# Patient Record
Sex: MALE | Race: WHITE | NOT HISPANIC OR LATINO | Employment: FULL TIME | URBAN - METROPOLITAN AREA
[De-identification: names, ages, dates, MRNs, and addresses within clinical notes are randomized per-mention and may not be internally consistent; named-entity substitution may affect disease eponyms.]

---

## 2017-09-19 ENCOUNTER — TELEPHONE (OUTPATIENT)
Dept: TRANSPLANT | Facility: CLINIC | Age: 47
End: 2017-09-19

## 2017-09-19 NOTE — TELEPHONE ENCOUNTER
Call placed to patient in review of outstanding transplant follow up. Patient agreeable to complete labs and clinic visit. Patient reports no current transplant related issues and follows with Dr. Townsend on a regular bases. Hx recurrent GN, proteinuria and was to have GI work up locally. Patient has not had that done. Records requested from Dr. Townsend office.

## 2017-10-12 ENCOUNTER — DOCUMENTATION ONLY (OUTPATIENT)
Dept: TRANSPLANT | Facility: CLINIC | Age: 47
End: 2017-10-12

## 2017-10-12 LAB
EXT ALBUMIN: 2.3
EXT ALKALINE PHOSPHATASE: 43
EXT ALT: 12
EXT AST: 16
EXT BACTERIA UA: ABNORMAL
EXT BILIRUBIN DIRECT: 0 MG/DL
EXT BILIRUBIN TOTAL: 0.7
EXT BUN: 42
EXT CALCIUM: 7.3
EXT CHLORIDE: 110
EXT CO2: 24
EXT CREATININE: 3.2 MG/DL
EXT EOSINOPHIL%: 1.3
EXT GFR MDRD NON AF AMER: 22
EXT GLUCOSE UA: 150
EXT GLUCOSE: 74
EXT HEMATOCRIT: 40.1
EXT HEMOGLOBIN: 13.6
EXT LYMPH%: 18.4
EXT MAGNESIUM: 1.8
EXT MONOCYTES%: 7.9
EXT NITRITES UA: ABNORMAL
EXT PHOSPHORUS: 5.8
EXT PLATELETS: 180
EXT POTASSIUM: 3.8
EXT PROT/CREAT RATIO UR: 12.71
EXT PROTEIN TOTAL: 4.8
EXT PROTEIN UA: >=500
EXT RBC UA: 48
EXT SEGS%: 71.9
EXT SODIUM: 140 MMOL/L
EXT TACROLIMUS LVL: 3.3
EXT WBC UA: 6
EXT WBC: 12.2

## 2017-10-14 ENCOUNTER — DOCUMENTATION ONLY (OUTPATIENT)
Dept: TRANSPLANT | Facility: CLINIC | Age: 47
End: 2017-10-14

## 2017-10-15 NOTE — PROGRESS NOTES
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 Now has worsening proteinuria and significant CKD. At present I see no reason to change action plan despite worsened proteinuria with stable-improved creatinine given hx non-adherence.  Let's discuss at Wednesday staff metting to review recent outpatient labs and get group consensus about further treatment (if any).

## 2017-10-17 ENCOUNTER — DOCUMENTATION ONLY (OUTPATIENT)
Dept: TRANSPLANT | Facility: CLINIC | Age: 47
End: 2017-10-17

## 2017-10-17 LAB
EXT ALBUMIN: 2.4
EXT ALKALINE PHOSPHATASE: 46
EXT ALT: 13
EXT AST: 17
EXT BILIRUBIN DIRECT: 0 MG/DL
EXT BILIRUBIN TOTAL: 0.3
EXT BUN: 48
EXT CALCIUM: 8.1
EXT CHLORIDE: 106
EXT CO2: 24
EXT CREATININE: 3.9 MG/DL
EXT EOSINOPHIL%: 0.1
EXT GFR MDRD AF AMER: 22
EXT GFR MDRD NON AF AMER: 18
EXT GLUCOSE: 159
EXT HEMATOCRIT: 42.8
EXT HEMOGLOBIN: 14.1
EXT LYMPH%: 10.3
EXT MAGNESIUM: 1.8
EXT MONOCYTES%: 5.1
EXT PHOSPHORUS: 4.9
EXT PLATELETS: 203
EXT POTASSIUM: 3.8
EXT PROT/CREAT RATIO UR: 11.93
EXT PROTEIN TOTAL: 5
EXT SEGS%: 84.1
EXT SODIUM: 142 MMOL/L
EXT TACROLIMUS LVL: 4.5
EXT WBC: 11.1

## 2017-10-22 ENCOUNTER — COMMITTEE REVIEW (OUTPATIENT)
Dept: TRANSPLANT | Facility: CLINIC | Age: 47
End: 2017-10-22

## 2017-10-22 DIAGNOSIS — Z94.0 DECEASED-DONOR KIDNEY TRANSPLANT: ICD-10-CM

## 2017-10-22 DIAGNOSIS — N04.20 MEMBRANOUS GLOMERULONEPHRITIS WITH NEPHROSIS: ICD-10-CM

## 2017-10-22 DIAGNOSIS — T86.19 OTHER COMPLICATION OF KIDNEY TRANSPLANT: ICD-10-CM

## 2017-10-22 DIAGNOSIS — N17.9 AKI (ACUTE KIDNEY INJURY): ICD-10-CM

## 2017-10-22 NOTE — COMMITTEE REVIEW
Patient discussed at team mtg per request of Dr. Bedoya. Patient with Nephrotic range proteinuria has not followed up  With Transplant or General Nephrology recommendation for treatment after previous biopsy. Patient now with worsened proteinuria will be seen in transplant clinic. Team to determine if follow up biopsy is warranted before treatment since patient coming from [place].    Team review:  Patient to be seen in clinic and follow plan determined at that time. Patient must decide if he is interested in treatment here.  I was present at the meeting and attest to the decision of the committee.

## 2017-10-23 ENCOUNTER — OFFICE VISIT (OUTPATIENT)
Dept: TRANSPLANT | Facility: CLINIC | Age: 47
End: 2017-10-23
Payer: COMMERCIAL

## 2017-10-23 ENCOUNTER — DOCUMENTATION ONLY (OUTPATIENT)
Dept: TRANSPLANT | Facility: CLINIC | Age: 47
End: 2017-10-23

## 2017-10-23 VITALS
SYSTOLIC BLOOD PRESSURE: 192 MMHG | DIASTOLIC BLOOD PRESSURE: 132 MMHG | OXYGEN SATURATION: 97 % | WEIGHT: 279.13 LBS | RESPIRATION RATE: 20 BRPM | HEART RATE: 80 BPM | TEMPERATURE: 98 F | BODY MASS INDEX: 39.96 KG/M2 | HEIGHT: 70 IN

## 2017-10-23 DIAGNOSIS — N18.30 CKD (CHRONIC KIDNEY DISEASE) STAGE 3, GFR 30-59 ML/MIN: Chronic | ICD-10-CM

## 2017-10-23 DIAGNOSIS — Z94.0 IMMUNOSUPPRESSIVE MANAGEMENT ENCOUNTER FOLLOWING KIDNEY TRANSPLANT: ICD-10-CM

## 2017-10-23 DIAGNOSIS — T86.19 OTHER COMPLICATION OF KIDNEY TRANSPLANT: Primary | Chronic | ICD-10-CM

## 2017-10-23 DIAGNOSIS — Z79.899 IMMUNOSUPPRESSIVE MANAGEMENT ENCOUNTER FOLLOWING KIDNEY TRANSPLANT: ICD-10-CM

## 2017-10-23 DIAGNOSIS — Z94.0 DECEASED-DONOR KIDNEY TRANSPLANT: Chronic | ICD-10-CM

## 2017-10-23 DIAGNOSIS — N04.20 MEMBRANOUS GLOMERULONEPHRITIS WITH NEPHROSIS: Chronic | ICD-10-CM

## 2017-10-23 DIAGNOSIS — Z29.89 NEED FOR PROPHYLACTIC IMMUNOTHERAPY: Chronic | ICD-10-CM

## 2017-10-23 PROCEDURE — 99999 PR PBB SHADOW E&M-EST. PATIENT-LVL IV: CPT | Mod: PBBFAC,,, | Performed by: INTERNAL MEDICINE

## 2017-10-23 PROCEDURE — 99215 OFFICE O/P EST HI 40 MIN: CPT | Mod: S$GLB,,, | Performed by: INTERNAL MEDICINE

## 2017-10-23 RX ORDER — MYCOPHENOLATE MOFETIL 250 MG/1
750 CAPSULE ORAL 2 TIMES DAILY
Qty: 120 CAPSULE | Refills: 11 | Status: SHIPPED | OUTPATIENT
Start: 2017-10-23 | End: 2018-09-18 | Stop reason: ALTCHOICE

## 2017-10-23 RX ORDER — BUMETANIDE 2 MG/1
4 TABLET ORAL DAILY
COMMUNITY

## 2017-10-23 RX ORDER — METOLAZONE 2.5 MG/1
2.5 TABLET ORAL EVERY OTHER DAY
COMMUNITY

## 2017-10-23 RX ORDER — HYDRALAZINE HYDROCHLORIDE 10 MG/1
10 TABLET, FILM COATED ORAL 2 TIMES DAILY
COMMUNITY

## 2017-10-23 RX ORDER — TACROLIMUS 1 MG/1
1 CAPSULE ORAL EVERY 12 HOURS
Qty: 60 CAPSULE | Refills: 11 | Status: SHIPPED | OUTPATIENT
Start: 2017-10-23 | End: 2019-07-10 | Stop reason: ALTCHOICE

## 2017-10-23 NOTE — LETTER
October 23, 2017        Marcos Townsend  [address]  [facility] [address]  Phone: [phone]  Fax: [phone]             [facility]  [address]  [facility] [address]  Phone: [phone]   Patient: Marcos Dominguez   MR Number: [id number]   YOB: 1970   Date of Visit: 10/23/2017       Dear Dr. Marcos Townsend    Thank you for referring Marcos Dominguez to me for evaluation. Attached you will find relevant portions of my assessment and plan of care.    If you have questions, please do not hesitate to call me. I look forward to following Marcos Dominguez along with you.    Sincerely,    Kelly Aguila MD    Enclosure    If you would like to receive this communication electronically, please contact [email or web address] or [phone] to request [employer] access.    [employer] is a tool which provides read-only access to select patient information with whom you have a relationship. Its easy to use and provides real time access to review your patients record including encounter summaries, notes, results, and demographic information.    If you feel you have received this communication in error or would no longer like to receive these types of communications, please e-mail [email or web address]

## 2017-10-23 NOTE — PATIENT INSTRUCTIONS
Please weigh yourself daily.and record.  Goal is to lose 1-3 lbs daily.  Get frequent labs.  When edema resolved, call your nephrologist to lower diuretic.  Call Dr. Townsend if your weight does not drop (while you are swollen) or if you are gaining weight -  5 lb change is concerning.  Take hydralazine 10 mg twice daily and let Dr. Townsend' office know if BP remains >140/90.  Call your vascular surgeon about a new access for dialysis.    If you feel worse before you see Dr. Townsend again, please go to ED, especially if you develop chest pain, worsened breathing, nausea/vomiting.    Please get influenza vaccine this year and every fall in the future.  You can take the killed virus shot in the arm, but not the nasal spray (which is live vaccine).  The shot can be obtained at any pharmacy or health care facility.     Plan to see your kidney doctor in 2 weeks.

## 2017-10-23 NOTE — PROGRESS NOTES
Kidney Post-Transplant Assessment (note he arrived 20+ min late)    Referring Physician:   Current Nephrologist: Marcos Townsend    ORGAN: RIGHT KIDNEY  Donor Type:  - brain death  PHS Increased Risk: no  Cold Ischemia: 1467.6 mins  Induction Medications: thymoglobulin, steroids (prednisone,methylprednisolone,solumedrol,medrol,decadron)    Subjective:     CC:  Reassessment of renal allograft function and management of chronic immunosuppression.    HPI:  Mr. Dominguez is a 47 y.o. year old White male who received a  - brain death kidney transplant on 11.  He has CKD stage 4 - GFR 15-29 and his baseline creatinine was 1.5, but was 2.2 in 2016 and is now 3.2-3.9. He takes mycophenolate mofetil, prednisone and tacrolimus for maintenance immunosuppression, BUT has been of MMF and cannot recall for how long or why it was stopped or by whom. He denies any recent hospitalizations or ER visits since his previous clinic visit.    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 He saw us last 16. At that visit, plan was he would complete GI w/u locally (inc cscope), and if no secondary cause ofr MGN was found, it was recommended he receive rituxan 1 gram q2 weeks x 2 doses. I personally communicated this plan to Dr. Townsend (see tel enc 16).  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 He is here today after a recent ICU admission for dyspnea and edema. At that time, he was given higher dose of diuretic with improvement of symptoms and d/c'd 10/13/17. He was home for a few days when he noted worsening dyspnea and saw NP at Dr. Townsend' office 10/19. The, he was  "given bumex, xaroxolyn with good response, but he is unsure how much weight he lost. He states he feels better overall now than he did last week when he saw provider 10/19.  HTN is noted to be poorly controlled today. Scale shows 40# weight gain since he was last seen 8/2016. He was started on hydralazine recently (after ICU admit), but has not yet taken first dose today (now ~2PM).  He also reports having AVF ligated 2 mos ago d/t hand ischemia.   The GI symptoms he had last summer have resolved, and he has no N/V/D presently.    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 Previously reported FSGS)   -Failed DD kidney transplant #1 2/99-1/08  -Kidney transplant #2 on 7/4/11. 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    Review of Systems   Constitutional: Positive for unexpected weight change.   Respiratory: Positive for shortness of breath (stella BEGUM).    Cardiovascular: Positive for leg swelling. Negative for chest pain.   Gastrointestinal: Negative for abdominal pain, nausea and vomiting.   Genitourinary: Negative for difficulty urinating.   Skin: Negative for rash.   Allergic/Immunologic: Positive for immunocompromised state.     Objective:   Blood pressure (!) 192/132, pulse 80, temperature 98 °F (36.7 °C), temperature source Oral, resp. rate 20, height 5' 10" (1.778 m), weight 126.6 kg (279 lb 1.6 oz), SpO2 97 %.body mass index is 40.05 kg/m².    Physical Exam   Constitutional: He is oriented to person, place, and time. He appears well-developed and well-nourished.   HENT:   Head: Normocephalic.   Eyes: Conjunctivae are normal.   Cardiovascular: Normal rate and regular rhythm.    Pulmonary/Chest: Effort normal.   No crackles " farida, but patient clearly winded after walking from bathroom to exam. Tachypnea resolved after sitting for few min.   Abdominal: Soft. He exhibits no mass. There is no tenderness.   Musculoskeletal: He exhibits edema (1-2+ bila LE).   Neurological: He is alert and oriented to person, place, and time.   Skin: Skin is dry. No rash noted.   Psychiatric: He has a normal mood and affect. Judgment normal.     Labs:  Lab Results   Component Value Date    EXTANC  2016      Comment:      unsolicited labs, elevated protein creatinine ratio.    EXTWBC 11.1 10/16/2017    EXTSEGS 84.1 10/16/2017    EXTPLATELETS 203 10/16/2017    EXTHEMOGLOBI 14.1 10/16/2017    EXTHEMATOCRI 42.8 10/16/2017    EXTCREATININ 3.9 10/16/2017    EXTSODIUM 142 10/16/2017    EXTPOTASSIUM 3.8 10/16/2017    EXTBUN 48 10/16/2017    EXTCO2 24 10/16/2017    EXTCALCIUM 8.1 10/16/2017    EXTPHOSPHORU 4.9 10/16/2017    EXTGLUCOSE 159 10/16/2017    EXTALBUMIN 2.4 10/16/2017    EXTAST 17 10/16/2017    EXTALT 13 10/16/2017    EXTBILITOTAL 0.3 10/16/2017     Lab Results   Component Value Date    EXTTACROLVL 4.5 10/16/2017    EXTPROTCRE 11.93 10/16/2017    EXTPTHINTACT 80.6 2016    EXTPROTEINUA >=500 (H) 10/10/2017    EXTWBCUA 6 10/10/2017    EXTRBCUA 48 10/10/2017   Labs were reviewed with the patient.    Assessment:     1. Other complication of kidney transplant    2. 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 -donor kidney transplant #2 for FSGS 2011    4. CKD (chronic kidney disease) stage 3, GFR 30-59 ml/min    5. Chronic immunosuppression with Prograf, Cellcept and Prednisone    6. Immunosuppressive management encounter following kidney transplant        Plan:   Unfortunately, CKD appears to be progressing and he did not receive recommended therapy for recurrent MGN. He is now approaching ESRD and needs aggressive CKD care.  I advised:  -Continue to take diuretics at the higher doses recommended last  week.  -Stay on low Na diet.  -See Dr. Townsend within next week.  -Report to ED if breathing worsens or develops new symptoms (intractable HA, N/V, s/s uremia reviewed).  -Reminded of the importance of obtaining flu vaccination this and every fall.   -See vascular surgeon about new dialysis access planning.  -I refilled MMF and tacro.  -He was interested in a higher dose of Chantix; I explained it is safe to use, but I am not familiar with the dosing, so I suggested he discuss w previous prescribing doctor.  -Advised to keep us posted on his progress.    Follow-up:   Annual follow-up with kidney transplant clinic with repeat labs, including renal function panel with UA, urine protein/creatinine ratio, and drug trough level q3 months.  Patient also reminded to follow-up with general nephrologist.    Kelly Aguila MD       Education:   Material provided to the patient.  Patient reminded to call with any health changes since these can be early signs of significant complications.  Also, I advised the patient to be sure any new medications or changes of old medications are discussed with either a pharmacist or physician knowledgeable with transplant to avoid rejection/drug toxicity related to significant drug interactions.    UNOS Patient Status  Functional Status: 60% - Requires occasional assistance but is able to care for needs  Physical Capacity: No Limitations

## 2018-08-24 ENCOUNTER — TELEPHONE (OUTPATIENT)
Dept: TRANSPLANT | Facility: CLINIC | Age: 48
End: 2018-08-24

## 2018-09-06 DIAGNOSIS — Z76.82 ORGAN TRANSPLANT CANDIDATE: Primary | ICD-10-CM

## 2018-09-18 ENCOUNTER — HOSPITAL ENCOUNTER (OUTPATIENT)
Dept: RADIOLOGY | Facility: HOSPITAL | Age: 48
Discharge: HOME | End: 2018-09-18
Attending: NURSE PRACTITIONER
Payer: MEDICARE

## 2018-09-18 ENCOUNTER — OFFICE VISIT (OUTPATIENT)
Dept: TRANSPLANT | Facility: CLINIC | Age: 48
End: 2018-09-18
Payer: MEDICARE

## 2018-09-18 ENCOUNTER — TELEPHONE (OUTPATIENT)
Dept: TRANSPLANT | Facility: CLINIC | Age: 48
End: 2018-09-18

## 2018-09-18 ENCOUNTER — HOSPITAL ENCOUNTER (OUTPATIENT)
Dept: RADIOLOGY | Facility: HOSPITAL | Age: 48
Discharge: HOME | End: 2018-09-18
Attending: NURSE PRACTITIONER
Payer: COMMERCIAL

## 2018-09-18 VITALS
OXYGEN SATURATION: 96 % | RESPIRATION RATE: 18 BRPM | HEART RATE: 105 BPM | HEIGHT: 70 IN | TEMPERATURE: 98 F | SYSTOLIC BLOOD PRESSURE: 159 MMHG | DIASTOLIC BLOOD PRESSURE: 95 MMHG | BODY MASS INDEX: 35.63 KG/M2 | WEIGHT: 248.88 LBS

## 2018-09-18 DIAGNOSIS — Z76.82 ORGAN TRANSPLANT CANDIDATE: ICD-10-CM

## 2018-09-18 DIAGNOSIS — Z76.82 ORGAN TRANSPLANT CANDIDATE: Primary | ICD-10-CM

## 2018-09-18 DIAGNOSIS — Z01.818 PRE-TRANSPLANT EVALUATION FOR CHRONIC KIDNEY DISEASE: ICD-10-CM

## 2018-09-18 DIAGNOSIS — Z79.899 IMMUNOSUPPRESSIVE MANAGEMENT ENCOUNTER FOLLOWING KIDNEY TRANSPLANT: ICD-10-CM

## 2018-09-18 DIAGNOSIS — Z99.2 ESRD (END STAGE RENAL DISEASE) ON DIALYSIS: ICD-10-CM

## 2018-09-18 DIAGNOSIS — Z94.0 DECEASED-DONOR KIDNEY TRANSPLANT: Chronic | ICD-10-CM

## 2018-09-18 DIAGNOSIS — N18.6 ESRD (END STAGE RENAL DISEASE) ON DIALYSIS: ICD-10-CM

## 2018-09-18 DIAGNOSIS — T86.19 OTHER COMPLICATION OF KIDNEY TRANSPLANT: Chronic | ICD-10-CM

## 2018-09-18 DIAGNOSIS — Z94.0 IMMUNOSUPPRESSIVE MANAGEMENT ENCOUNTER FOLLOWING KIDNEY TRANSPLANT: ICD-10-CM

## 2018-09-18 PROCEDURE — 76770 US EXAM ABDO BACK WALL COMP: CPT | Mod: TC,TXP

## 2018-09-18 PROCEDURE — 76776 US EXAM K TRANSPL W/DOPPLER: CPT | Mod: 26,TXP,, | Performed by: RADIOLOGY

## 2018-09-18 PROCEDURE — 72170 X-RAY EXAM OF PELVIS: CPT | Mod: TC,FY,TXP

## 2018-09-18 PROCEDURE — 93978 VASCULAR STUDY: CPT | Mod: TC,TXP

## 2018-09-18 PROCEDURE — 72170 X-RAY EXAM OF PELVIS: CPT | Mod: 26,TXP,, | Performed by: RADIOLOGY

## 2018-09-18 PROCEDURE — 71046 X-RAY EXAM CHEST 2 VIEWS: CPT | Mod: TC,FY,TXP

## 2018-09-18 PROCEDURE — 99215 OFFICE O/P EST HI 40 MIN: CPT | Mod: PBBFAC,TXP | Performed by: INTERNAL MEDICINE

## 2018-09-18 PROCEDURE — 99215 OFFICE O/P EST HI 40 MIN: CPT | Mod: S$PBB,GC,TXP, | Performed by: INTERNAL MEDICINE

## 2018-09-18 PROCEDURE — 99204 OFFICE O/P NEW MOD 45 MIN: CPT | Mod: S$PBB,TXP,, | Performed by: NURSE PRACTITIONER

## 2018-09-18 PROCEDURE — 93978 VASCULAR STUDY: CPT | Mod: 26,TXP,, | Performed by: RADIOLOGY

## 2018-09-18 PROCEDURE — 76776 US EXAM K TRANSPL W/DOPPLER: CPT | Mod: TC,TXP

## 2018-09-18 PROCEDURE — 76770 US EXAM ABDO BACK WALL COMP: CPT | Mod: 26,TXP,, | Performed by: RADIOLOGY

## 2018-09-18 PROCEDURE — 99999 PR PBB SHADOW E&M-EST. PATIENT-LVL V: CPT | Mod: PBBFAC,TXP,, | Performed by: INTERNAL MEDICINE

## 2018-09-18 PROCEDURE — 71046 X-RAY EXAM CHEST 2 VIEWS: CPT | Mod: 26,TXP,, | Performed by: RADIOLOGY

## 2018-09-18 PROCEDURE — 99204 OFFICE O/P NEW MOD 45 MIN: CPT | Mod: S$PBB,TXP,, | Performed by: TRANSPLANT SURGERY

## 2018-09-18 NOTE — PROGRESS NOTES
PHARM.D. PRE-TRANSPLANT NOTE:    This patient's medication therapy was evaluated as part of his pre-transplant evaluation.    The following pharmacologic concerns were noted: apixaban (blood thinner), patient also currently on tacrolimus and prednisone 5mg from previous transplant (previously on MMF) - no issues with any of the medications    This patient's medication profile was reviewed for contraindications for DAA Hepatitis C therapy:    [x]  No current inducers of CYP 3A4 or PGP  [x]  No amiodarone on this patient's EMR profile in the last 24 months  [x]  No past or current atrial fibrillation on this patient's EMR profile       Current Outpatient Medications   Medication Sig Dispense Refill    apixaban (ELIQUIS) 2.5 mg Tab Take 2.5 mg by mouth 2 (two) times daily.      bumetanide (BUMEX) 2 MG tablet Take 4 mg by mouth once daily.      calcitRIOL (ROCALTROL) 0.25 MCG Cap Take 0.25 mcg by mouth every Monday and Thursday.       cinacalcet (SENSIPAR) 90 MG Tab Take 90 mg by mouth daily with breakfast.        divalproex (DEPAKOTE) 500 MG TbEC Take 500 mg by mouth every 12 (twelve) hours.       furosemide (LASIX) 80 MG tablet Take 80 mg by mouth 2 (two) times daily.      hydrALAZINE (APRESOLINE) 10 MG tablet Take 10 mg by mouth 2 (two) times daily.      metOLazone (ZAROXOLYN) 2.5 MG tablet Take 2.5 mg by mouth every other day.      multivitamin capsule Take 1 capsule by mouth once daily.        predniSONE (DELTASONE) 5 MG tablet Take 5 mg by mouth once daily.       ranitidine (ZANTAC) 150 MG tablet Take 150 mg by mouth once daily.      tacrolimus (PROGRAF) 1 MG Cap Take 1 capsule (1 mg total) by mouth every 12 (twelve) hours. Brand Medically Necessary Dispense as written 60 capsule 11     No current facility-administered medications for this visit.          Currently Mr Dominguez is responsible for preparing / administering this patient's medications on a daily basis.  I am available for consultation  and can be contacted, as needed by the other members of the Kidney Transplant team.

## 2018-09-18 NOTE — PROGRESS NOTES
Transplant Recipient Adult Psychosocial Assessment  Pt has been transplanted before   1999    2.  [facility]  don[facility] 11    Marcos Dominguez  [address]   [facility] [address]    Telephone Information:   Mobile [phone]   Home  [phone] (home)  Work  There is no work phone number on file.  E-mail  [email or web address]    Sex: male  YOB: 1970  Age: 47 y.o.    Encounter Date: 2018  [place] Citizen: yes  Primary Language: English   Needed: no    Emergency Contact:  Manuel Dominguez, 22 yo son, [facility], does drive/own car, college student in marine biology. [phone]  Meghann Eaton, 69 yo mother, [facility], does drive/own car, unemployed. 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 [phone]    Family/Social Support:   Number of dependents/: pt denies  Marital history: 2 x   Other family dynamics: Pt reports has been transplanted 2 times previously. Pt reports last transplant was at [facility] and his mother was his transplant caregiver. Pt reports mother living in [facility] and is able to assist with transplant. Pt did not comment on his father. Pt reports lives alone in [facility] (near [facility]). Pt reports his 21 yo college student son lives in [facility] as well and reports son is supportive and will be able to assist with organ transplant as needed. Pt's self employed friend Greta Grove (Yessica MS) was with patient for pre transplant evaluation and reports will be patient's primary transplant caregiver.    Household Composition:  Pt reports lives alone.    Do you and your caregivers have access to reliable transportation? yes  PRIMARY CAREGIVER: Greta Grove, friend, will be primary caregiver, phone number [phone].      provided in-depth information to patient and caregiver  regarding pre- and post-transplant caregiver role.   strongly encourages patient and caregiver to have concrete plan regarding post-transplant care giving, including back-up caregiver(s) to ensure care giving needs are met as needed.    Patient and Caregiver states understanding all aspects of caregiver role/commitment and is able/willing/committed to being caregiver to the fullest extent necessary.    Patient and Caregiver verbalizes understanding of the education provided today and caregiver responsibilities.         remains available. Patient and Caregiver agree to contact  in a timely manner if concerns arise.      Able to take time off work without financial concerns: yes. Greta reports is self employed and able to miss work as needed for transplant.    Additional Significant Others who will Assist with Transplant:  Manuel Dominguez, 22 yo son, [facility], does drive/own car, college student in marine biology. [phone]  Meghann Eaton, 67 yo mother, [facility], does drive/own car, unemployed. [phone]    Living Will: no  Healthcare Power of : no  Advance Directives on file: <<no information> per medical record.  Verbally reviewed LW/HCPA information.   provided patient with copy of LW/HCPA documents and provided education on completion of forms.    Living Donors: Education and resource information given to patient.    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 Pt reports last full time job was as ..    Spouse/Significant Other Employment: pt  reports is not     Disabled: yes due to ESRD    Monthly Income:  $900 per month including disability and earned income  Able to afford all costs now and if transplanted, including medications: yes Pt reports has been transplanted before and is keenly aware of costs of transplant, including transplant medicines. Pt reports post transplant plan to return to full time employment with more income and with insurance benefits.  Patient and Caregiver verbalizes understanding of personal responsibilities related to transplant costs and the importance of having a financial plan to ensure that patients transplant costs are fully covered.       provided fundraising information/education. Patient and Caregiververbalizes understanding.   remains available.    Insurance:   Payor/Plan Subscr  Sex Relation Sub. Ins. ID Effective Group Num   1. BLUE CROSS BL* NURIA VALENZUELA* 1970 Male  [id number] 10/23/17 [id number]                                   [address] [address]   2. [place]ICARE - [place]* NURIA VALENZUELA* 1970 Male  [id number] 1998                                    [address]     Primary Insurance (for UNOS reporting): Private Insurance BCBS COBRA from former employer. [facility] assists with insurance premiums. Pt reports understanding [employer] assistance may discontinue immediately post transplant.  Secondary Insurance (for UNOS reporting): Public Insurance - Medicare FFS (Fee For Service) Provided patient with Medicare book today. Pt reports understanding Medicare will end 3 years post transplant.  Patient and Caregiver verbalizes clear understanding that patient may experience difficulty obtaining and/or be denied insurance coverage post-surgery. This includes and is not limited to disability insurance, life insurance, health insurance, burial insurance, long term care insurance, and other insurances.      Patient and Caregiver also reports understanding that future health concerns related to  or unrelated to transplantation may not be covered by patient's insurance.  Resources and information provided and reviewed.     Patient and Caregiver provides verbal permission to release any necessary information to outside resources for patient care and discharge planning.  Resources and information provided are reviewed.      Dialysis Adherence: Patient and Caregiver reports high compliance with all dialysis treatments and instructions. Pt reports initially at start of dialysis had problems with dialysis but has been highly compliant with dialysis for last 9 months.  Dialysis compliance update form provided to dialysis unit for completion.    Infusion Service: patient utilizing? no  Home Health: patient utilizing? no  DME: no  Pulmonary/Cardiac Rehab: pt denies   ADLS:  Independent with self care and medication management    Adherence:   Pt reports is highly compliant with all medical appointments and instructions.  Adherence education and counseling provided.     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     Need for prophylactic immunotherapy     Organ transplant     Kidney    Polycythemia, secondary     Renal hypertension     Secondary hyperparathyroidism of renal origin     Vitamin D deficiency 2014     Social History     Tobacco Use    Smoking status: Former Smoker     Packs/day: 1.00     Years: 32.00     Pack years: 32.00     Types: Cigarettes     Start date: 1985     Last attempt to quit: 2018     Years since quittin.2    Smokeless tobacco: Never Used   Substance Use Topics     Alcohol use: No     Comment: Occasionally      Social History     Substance and Sexual Activity   Drug Use No     Social History     Substance and Sexual Activity   Sexual Activity Not on file       Per Today's Psychosocial:  Tobacco: none, patient denies any use at this time. Former smoker 1 ppd. Pt reports plan to abstain.  Alcohol: pt reports occasional alcohol use.  Illicit Drugs/Non-prescribed Medications: none, patient denies any use. Pt reports plan to abstain.    Patient and Caregiver states clear understanding of the potential impact of substance use as it relates to transplant candidacy and is aware of possible random substance screening.  Substance abstinence/cessation counseling, education and resources provided and reviewed.     Arrests/DWI/Treatment/Rehab: patient denies    Psychiatric History:    Mental Health: Pt denies any overwhelming feelings of depression or anxiety at this time. Pt denies any mental health history.  Psychiatrist/Counselor: pt denies  Medications:  Pt denies  Suicide/Homicide Issues: pt denies any si/hi history at this time   Safety at home: pt reports living in safe environment    Knowledge: Patient and Caregiver states having clear understanding and realistic expectations regarding the potential risks and potential benefits of organ transplantation and organ donation and agrees to discuss with health care team members and support system members, as well as to utilize available resources and express questions and/or concerns in order to further facilitate the pt informed decision-making.  Resources and information provided and reviewed.    Patient and Caregiver is aware of [facility]'s affiliation and/or partnership with agencies in home health care, LTAC, SNF, [facility], and other hospitals and clinics.    Understanding: Patient and Caregiver reports having a clear understanding of the many lifetime commitments involved with being a transplant recipient, including costs, compliance,  medications, lab work, procedures, appointments, concrete and financial planning, preparedness, timely and appropriate communication of concerns, abstinence (ETOH, tobacco, illicit non-prescribed drugs), adherence to all health care team recommendations, support system and caregiver involvement, appropriate and timely resource utilization and follow-through, mental health counseling as needed/recommended, and patient and caregiver responsibilities.  Social Service Handbook, resources and detailed educational information provided and reviewed.  Educational information provided.    Patient and Caregiver also reports current and expected compliance with health care regime and states having a clear understanding of the importance of compliance.      Patient and Caregiver reports a clear understanding that risks and benefits may be involved with organ transplantation and with organ donation.       Patient and Caregiver also reports clear understanding that psychosocial risk factors may affect patient, and include but are not limited to feelings of depression, generalized anxiety, anxiety regarding dependence on others, post traumatic stress disorder, feelings of guilt and other emotional and/or mental concerns, and/or exacerbation of existing mental health concerns.  Detailed resources provided and discussed.      Patient and Caregiver agrees to access appropriate resources in a timely manner as needed and/or as recommended, and to communicate concerns appropriately.  Patient and Caregiver also reports a clear understanding of treatment options available.     Patient and Caregiver received education in a group setting.   reviewed education, provided additional information, and answered questions.    Feelings or Concerns: Pt reports high motivation to pursue organ transplant.    Coping: Pt reports is coping well over all with ESRD and need for organ transplant. Pt reports el best by exercising 2 x week by  walking 1/2 mile, staying active with working and staying engaged with family.    Goals: Pt reports plan to return to work full time as  once transplanted and recovered. Patient referred to Vocational Rehabilitation.    Interview Behavior: Patient and Caregiver presents as alert and oriented x 4, pleasant, good eye contact, well groomed, recall good, concentration/judgement good, average intelligence, calm, communicative, cooperative and asking and answering questions appropriately. Pt's friend Greta was with patient for transplant appointments with permission. Greta was highly supportive and engaged throughout the session.         Transplant Social Work - Candidacy  Assessment/Plan:     Psychosocial Suitability: Patient presents as a suitable candidate for kidney transplant at this time pending dialysis compliance update. Based on psychosocial risk factors, patient presents as low risk, due to patient reporting having organ transplant caregiver/transportation plan, medical insurance plan and plan to afford transplant costs all in place. Dialysis compliance update form sent to dialysis center for completion.    Recommendations/Additional Comments: Dialysis compliance update form sent to dialysis center for completion.    Paula De Leon MSW LCSW

## 2018-09-18 NOTE — PROGRESS NOTES
Kidney - Transplant Progress Note     Referring Physician:   Current Nephrologist: Marcos Townsend    Subjective:     CC:  Evaluating if is a good  candidate for transplant.     HPI:  Mr. Dominguez is a 47 y.o. year old with pmh of htn White male who received a  - brain death kidney transplant on 11 and another kidney transplant on .  He has ESRD ON HD MWF ( dry weight 246 pounds)  for 11 months started in 2017. He takes prednisone and tacrolimus for maintenance immunosuppression per his community nephrologist to prevent antibody formation.      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    Previous transplant: yes x 2 (see above). 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  Found back then to be JERRY 2R positive, and workup for secondary causes of GN was pursued, but none were found.  Efforts were made to order Rituximab close to home, which failed.      Today patient with out complaint denies chest pain, sob, nausea , vomiting fever, chills. Last hospitalization was 4 months ago due to uncontrol htn. He stop smoking 3 months ago. He say that do exercise 2-3 times a week. He walk 1/2 a mile. He say that he is going consistently with dialysis  "section.        Review of Systems   Constitutional: Negative.    HENT: Negative.    Eyes: Negative.    Respiratory: Negative.  Negative for shortness of breath    Cardiovascular: Negative.  Negative for chest pain and leg swelling.   Gastrointestinal: Negative.  Negative for abdominal pain, nausea and vomiting.   Endocrine: Negative.    Genitourinary: Negative.  Negative for difficulty urinating.   Musculoskeletal: Negative.    Skin: Negative.  Negative for rash.   Allergic/Immunologic: Positive for immunocompromised state.   Neurological: Negative.    Hematological: Negative.    Psychiatric/Behavioral: Negative.      Objective:   Blood pressure (!) 159/95, pulse 105, temperature 98.2 °F (36.8 °C), temperature source Oral, resp. rate 18, height 5' 10" (1.778 m), weight 112.9 kg (248 lb 14.4 oz), SpO2 96 %.body mass index is 35.71 kg/m².     A&O x3, NAD.  No icterus, conjuctiva and lids normal w/o injection.  Skin warm and dry to touch.  No rashes noted on exposed skin.  Lungs CTA, unlabored.  Heart regular, no rub.  Abdomen soft, nontender, no masses.  Extremities w/o cyanosis or edema.     Labs:  Lab Results   Component Value Date    WBC 9.64 2018    HGB 14.8 2018     2018     2018    K 3.7 2018     2018    CO2 23 2018    BUN 14 2018    CREATININE 4.2 (H) 2018    EGFRNONAA 15.5 (A) 2018    CALCIUM 8.5 (L) 2018    PHOS 4.0 2018    ALBUMIN 3.2 (L) 2018    MG 1.8 2011    AST 14 2018    ALT 9 (L) 2018    UTPCR 2.23 (H) 2016    TACROLIMUS 12.7 2011     Assessment:     1. Organ transplant candidate    2. Other complication of kidney transplant    3. -donor kidney transplant    4. ESRD (end stage renal disease) on dialysis    5. Immunosuppressive management encounter following kidney transplant    6. Pre-transplant evaluation for chronic kidney disease      Because his previous 2 " transplants, BMI and history of smoking, this patient is a high risk candidate for re-transplantation.      Plan     Follow up with cardiology for a echocardiogram . Get copy of stress done earlier this yr in [place]  Obtain pulmonary function tests for smoking hx.   Obtain prior colonoscopy report (states it was less of 10 years) he will bring results.   Patient say that he will bring his medical record from his hospital.      Shmuel Estes MD     STAFF:  Marcos Adrian and Angelica was discussed with Dr. Briones as outlined.  I have personally  interviewed and evaluated him, reviewed the information in this note, and agree with the findings listed in the attached encounter.  His BMI, 2 prior transplant, likely recurrent membranous glomerulonephritis in the transplant all place him at increased risk for re-transplantation.  However, at this time this risk does not appear prohibitive.  He continues to make approximately 1 L of urine daily, and reports no L UT S.  He remains on tacrolimus 1 mg b.i.d. and prednisone 5 mg daily per his nephrologist.  At this point he has no living donors, and we will need to address whether he should try to wean off of these altogether while waiting for a 3rd kidney transplant.  As noted above, he requires PFTs secondary to longstanding smoking history, especially since he was previously told he had COPD that is not clearly documented.  Will attempt to get stress test and colonoscopy reports previously done in [place], and avoid duplication.  In 2017, record review finds that he was supposed to see GI to have repeat endoscopic evaluation given his JERRY to our positivity.  He appears to not have followed through on this-->  We need posttransplant coordinator opinion regarding compliance.

## 2018-09-18 NOTE — PROGRESS NOTES
Pre Transplant Infectious Diseases Consult  Kidney Transplant Recipient Evaluation    Requesting Physician: Dr. Dietz    Reason for Visit:    Chief Complaint   Patient presents with    Kidney Transplant Evaluation     History of Present Illness  Marcos Dominguez is a 47 y.o. year old White male with advanced Kidney disease currently being evaluated for Kidney transplant.      He has history of prior  donor kidney transplant in  and .  He is currently on immunosuppression with Prograf only.  Currently on HD 3 days per week for the past year.      Denies any history transplant/opportunistic infectious issues after either transplant       1) Do you have a history of:   YES NO   Diabetes      [] [x]     Diabetic Foot Infection/Bone Infection  []        [x]     Surgical Removal of Spleen   []  [x]                  2) Have you had recurrent infections involving:             YES NO  Sinus infections  []         [x]   Sore Throat   []         [x]                 Prostate Infections  []         [x]              Bladder Infections  []         [x]                     Kidney Infections  []         [x]                               Intestinal Infections  []         [x]      Skin Infections   []         [x]       Reproductive Infections          []  [x]   Periodontal Disease  []         [x]        3)Have you ever had: YES     NO UNKNOWN      Chicken Pox   [x]         []  []   Shingles   []         [x]  []   Orolabial Herpes             []  [x]  []   Genital Herpes  []         [x]  []   Cytomegalovirus  []         [x]  []   Frannie-Barr Virus  []         [x]  []   Genital Warts   []         [x]  []   Hepatitis A   []         [x]  []   Hepatitis B   []         [x]  []   Hepatitis C   []         [x]  []   Syphilis   []         [x]  []   Gonorrhea   []         [x]  []   Pelvic Inflammatory   Disease   []         [x]  []   Chlamydia Infection  []         [x]  []   Intestinal parasites   or worms   []          [x]  []   Fungal Infections  []         [x]  []   Blood Infections  []         [x]  []       Comment:       4) Have you ever been exposed   YES NO  To someone with tuberculosis?  []   [x]   If yes, what treatment did you receive:     5) What states have you lived in? [place]. [place], [place], [place], [place]     6) What countries have you visited for more than 2 weeks?    No foreign travel                     YES NO  7) Did you have any associated infections?  []  [] n/a      8) Are you planning to travel outside the    []  [x]   [place] after your transplant?    9) Household                   YES NO  Do you have pets living in your house?    [x]         []   If yes, describe: Dog - fully vaccinated     Do you spend time or live on a farm or     []         [x]   have livestock or other farm animals?  If yes, which ones:    Do you have a fish tank?          []  [x]       Do you have a litter box?      []         [x]     Do you fish or hunt?       [x]         []     Do you clean or skin fish or animals?    [x]         []     Do you consume raw or undercooked    []         [x]   meat, fish, or shellfish?      10) What occupations have you had?  [employer] business, , [employer] business   11) Patient reports hobby to be fishing, outdoor activities             12)Do you garden or otherwise  YES NO   work in the soil?    [x]         []     13)Do you hike, camp, or spend     time in wooded areas?   [x]         []        14) The patient's immunization history was reviewed.    Have you ever received:  YES NO UNKNOWN DATES   Routine Childhood vaccines  [x]         []  []      Influenza vaccine   []  [x]  []    Pneumovax    []  []  []     Tetanus-diptheria   [x]         []  [] within last 5 years   Hepatitis A vaccine series       [x]  []  [] believes so    Hepatitis B vaccine series         [x]  []  [] completed series in dialysis    Meningitis vaccine   []         []  [x]    Varicella/zoster  vaccine  []         [x]  []      Based on the patients immunization history and serologies, immunizations were ordered:         Ordered  Not Ordered  Influenza Vaccine     [x]    [] Will get in Dialysis    Hepatitis A series at 0,  6 months   []    [x] Check IgG  Hepatitis B seriesat 0, 1, and 6 months  []    [x]   Hepatitis B High Dose 0,1, and 6 months  []    [x]   Prevnar      [x]    [] Rx given for after 18  Pneumovax      []    [x]    TDap       []    [x]    Zoster       []    [x] Recommended Shingrix  Menactra      []    [x]            The patient was encouraged to contact us about any problems that may develop after immunization and possible side effects were reviewed.      Previous Transplant: yes; organ: kidney, date: , complications: nephropathy.    Etiology of Kidney Disease: HTN    Allergies: No known drug allergies    There is no immunization history on file for this patient.  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     Need for prophylactic immunotherapy     Organ transplant     Kidney    Polycythemia, secondary     Renal hypertension     Secondary hyperparathyroidism of renal origin     Vitamin D deficiency 2014     Past Surgical History:   Procedure Laterality Date    AV FISTULA PLACEMENT      left upper arm    KIDNEY TRANSPLANT  1999    first kidney transplant [facility] [place] [facility]    KIDNEY TRANSPLANT  11    PATELLA SURGERY      right  partial patella removal    TONSILLECTOMY        Social History     Socioeconomic History    Marital status:      Spouse name: Not on file     Number of children: Not on file    Years of education: Not on file    Highest education level: Not on file   Social Needs    Financial resource strain: Not on file    Food insecurity - worry: Not on file    Food insecurity - inability: Not on file    Transportation needs - medical: Not on file    Transportation needs - non-medical: Not on file   Occupational History    Occupation: Self Employed    Tobacco Use    Smoking status: Current Every Day Smoker     Packs/day: 1.00     Years: 12.00     Pack years: 12.00     Types: Cigarettes    Tobacco comment: 6 cigarettes per day   Substance and Sexual Activity    Alcohol use: No     Comment: Occasionally     Drug use: No    Sexual activity: Not on file   Other Topics Concern    Not on file   Social History Narrative    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       Review of Systems   Constitution: Negative for chills, decreased appetite, fever, weakness, malaise/fatigue, night sweats, weight gain and weight loss.   HENT: Negative for congestion, ear pain, hearing loss, hoarse voice, sore throat and tinnitus.    Eyes: Negative for blurred vision, redness and visual disturbance.   Cardiovascular: Negative for chest pain, leg swelling and palpitations.   Respiratory: Negative for cough, hemoptysis, shortness of breath, sputum production and wheezing.    Endocrine: Negative for cold intolerance and heat intolerance.   Hematologic/Lymphatic: Negative for adenopathy. Does not bruise/bleed easily.   Skin: Negative for dry skin, itching, rash and suspicious lesions.   Musculoskeletal: Positive for neck pain. Negative for back pain, joint pain and myalgias.   Gastrointestinal: Negative for abdominal pain, constipation, diarrhea (occasionally ), heartburn, nausea and vomiting.   Genitourinary: Negative for dysuria, flank pain, frequency, hematuria, hesitancy and urgency.   Neurological: Negative for dizziness, headaches, numbness and paresthesias.    Psychiatric/Behavioral: Negative for depression and memory loss. The patient does not have insomnia and is not nervous/anxious.    Allergic/Immunologic: Negative for environmental allergies, HIV exposure, hives and persistent infections.     Physical Exam   Constitutional: He is oriented to person, place, and time. He appears well-developed and well-nourished.       HENT:   Head: Normocephalic and atraumatic.   Mouth/Throat: Uvula is midline, oropharynx is clear and moist and mucous membranes are normal. He has dentures. Abnormal dentition.   Eyes: Conjunctivae and lids are normal. Pupils are equal, round, and reactive to light. No scleral icterus.   Neck: Neck supple.   Cardiovascular: Normal rate and regular rhythm. Exam reveals no gallop and no friction rub.   No murmur heard.  Pulmonary/Chest: Effort normal and breath sounds normal. No respiratory distress. He has no decreased breath sounds. He has no wheezes. He has no rhonchi. He has no rales.   Abdominal: Soft. Normal appearance and bowel sounds are normal. He exhibits no distension and no mass. There is no hepatosplenomegaly. There is no tenderness. There is no rebound and no guarding.   Obese      Musculoskeletal: He exhibits no edema.   Lymphadenopathy:        Head (right side): No submental, no submandibular, no tonsillar, no preauricular, no posterior auricular and no occipital adenopathy present.        Head (left side): No submental, no submandibular, no tonsillar, no preauricular, no posterior auricular and no occipital adenopathy present.     He has no cervical adenopathy.     He has no axillary adenopathy.        Right: No inguinal, no supraclavicular and no epitrochlear adenopathy present.        Left: No inguinal, no supraclavicular and no epitrochlear adenopathy present.   Neurological: He is alert and oriented to person, place, and time. No cranial nerve deficit.   Skin: Skin is warm, dry and intact. No lesion and no rash noted. He is not  diaphoretic. No erythema. No pallor.   Psychiatric: He has a normal mood and affect. His behavior is normal.     Diagnostics:   RPR   Date Value Ref Range Status   07/31/2008 Non-Reactive Non-Reactive Final     CMV Antibodies   Date Value Ref Range Status   07/31/2008 Positive  Final     HIV-1/HIV-2 Ab   Date Value Ref Range Status   07/04/2011 Negative Negative Final     No results found for: HTLVIIIANTIB  Hepatitis B Surface Ag   Date Value Ref Range Status   07/04/2011 Negative Negative Final     Comment:     If further testing is needed, please contact the Blood Bank  within 3 days.     Hep B Core Total Ab   Date Value Ref Range Status   03/15/2011 Negative  Final     Hepatitis C Ab   Date Value Ref Range Status   07/31/2008 Negative  Final     No results found for: TOXOIGG  No components found for: TOXOIGGINTER  No results found for: QJV9HMJ  No results found for: CGZ2IIA  Varicella IgG   Date Value Ref Range Status   07/31/2008 4.59 (H) 0 - 0.90 ISR Final     Comment:     ISR          RESULTS        INTERPRETATION  =====================================================================  <or= 0.90    Negative      No detectable antibody to zoster  by the JOSE test. Such individuals are  presumed to be uninfected with Varicella  zoster and to be susceptible to primary  infection.  .  0.91-1.09    Equivocal  .  >or= 1.10    Positive      Indicates presence of detectable antibody  to Varicella zoster by the JOSE test.  Indicative of current or previous infection       Varicella Interpretation   Date Value Ref Range Status   07/31/2008 Positive (A)  Final     No results found for: STRONGANTIGG  No results found for: EPSTEINBARRV  Hep B S Ab   Date Value Ref Range Status   07/31/2008 Negative  Final     Quantiferon Gold TB   Date Value Ref Range Status   03/15/2011 Negative Not Detected Final     No results found for: HEPAIGM  No results found for: PPD  No results found for this or any previous visit.          Transplant Infectious Diseases - Candidacy    Assessment/Plan:     Transplant Candidacy: Based on available information, there are no identified significant barriers to transplantation from an infectious disease standpoint pending acceptable serologies.       1.  Vaccines today:  Check Hepatitis IgG.  ? Had during prior workup.   Will get flu in dialysis this week.    Shingrix series (this is not a live vaccine)  when available.  He will need Prevnar after 11/14/18 (one year after Pneumovax received in dialysis)   2.  Quantiferon Gold is pending.  If positive, please consult ID. If  Indeterminate, please draw T spot.  If T spot positive, please consult ID.    3.  RPR, strongyloides, HIV pending.  If positive, please consult ID         Final determination of transplant candidacy will be made once evaluation is complete and reviewed by the Transplant Selection Committee.    Susan Méndez, APRN, ANP         Counseling:I discussed with Marcos Nguyễn the risk for increased susceptibility to infections following transplantation including increased risk for infection right after transplant and if rejection should occur.  The patient has been counseled on the importance of vaccinations including but not limited to a yearly flu vaccine.  Specific guidance has been provided to the patient regarding the patients occupation, hobbies and activities to avoid future infectious complications including but not limited to avoiding undercooked meats and seafood, proper hygiene, and contact with animals.

## 2018-09-18 NOTE — TELEPHONE ENCOUNTER
Reviewed pt transplant labs.  Notified dialysis unit dietitian of the following abnormal labs via fax.     Albumin   3.2g/dl      Tierra Graham MS RD LDN

## 2018-09-18 NOTE — PROGRESS NOTES
Transplant Surgery  Kidney Transplant Recipient Evaluation    Referring Physician: Marcos Townsend  Current Nephrologist: Marcos Townsend    Subjective:     Reason for Visit: evaluate transplant candidacy    History of Present Illness: Marcos Dominguez is a 47 y.o. year old male undergoing transplant evaluation.    Dialysis History: Marcos Adrian is on hemodialysis.      Transplant History: 7/4/2011 (Kidney) - also had kidney transplant at [facility] on right side. This would be his third transplant.    Etiology of Renal Disease: Focal Glomerular Sclerosis (Focal Segmental - FSG) (based on medical records from referral).    Review of Systems    Objective:     Physical Exam:    Body mass index is 35.71 kg/m².    Constitutional:   Vitals reviewed: yes   Well-nourished and well-groomed: yes  Eyes:   Sclerae icteric: no   Extraocular movements intact: yes  GI:    Bowel sounds normal: yes   Tenderness: no    If yes, quadrant/location: not applicable   Palpable masses: no    If yes, quadrant/location: not applicable   Hepatosplenomegaly: no   Ascites: no   Hernia: no    If yes, type/location: not applicable   Surgical scars: yes    If yes, type/location: bilateral kidney transplant  not applicable  Resp:   Effort normal: yes   Breath sounds normal: yes    CV:   Regular rate and rhythm: yes   Heart sounds normal: yes   Femoral pulses normal: yes   Extremities edematous: no  Skin:   Rashes or lesions present: no    If yes, describe:not applicable   Jaundice:: no    Musculoskeletal:   Gait normal: yes   Strength normal: yes  Psych:   Oriented to person, place, and time: yes   Affect and mood normal: yes    Additional comments: fairly severe central obesity, not prohibitive; excellent femoral pulses    Counseling: We provided Marcos Dominguez with a group education session today.  We discussed kidney transplantation at length with him, including risks, potential complications, and alternatives in the  management of his renal failure.  The discussion included complications related to anesthesia, bleeding, infection, primary nonfunction, and ATN.  I discussed the typical postoperative course, length of hospitalization, the need for long-term immunosuppression, and the need for long-term routine follow-up.  I discussed living-donor and -donor transplantation and the relative advantages and disadvantages of each.  I also discussed average waiting times for both living donation and  donation.  I discussed national and center-specific survival rates.  I also mentioned the potential benefit of multicenter listing to candidates listed with centers within more than one organ procurement organization.  All questions were answered.    Final determination of transplant candidacy will be made once evaluation is complete and reviewed by the Kidney & Kidney/Pancreas Selection Committee.         Transplant Surgery - Candidacy   Assessment/Plan:   Marcos Dominguez has end stage renal disease (ESRD) on dialysis. I have concerns with his obesity and two prior transplants, but another transplant should still be very feasible. Based on available information, Marcos Dominguez is a suitable kidney transplant candidate.     Masoud Hernandez MD

## 2018-09-18 NOTE — Clinical Note
This gentleman resumed dialysis October 2017!  He presented today for 3rd transplant evaluation.  We need posttransplant coordinator opinion regarding compliance as part of re-evaluation..

## 2018-09-18 NOTE — PATIENT INSTRUCTIONS
From Dr. Qureshi:  It is my privilege to participate in your post-transplant care!   -Try to get us copies of your stress testing (or any heart testing) and colonoscopy.  Please be sure to let us know if you have any questions or concerns about your health care - we cannot help you if we do not know.  Don't forget we are on call 24/7 for any emergencies.   Best Wishes,  Dr. Kiera Aguila

## 2018-09-18 NOTE — PROGRESS NOTES
INITIAL PATIENT EDUCATION NOTE    Mr. Marcos Dominguez was seen in pre-kidney transplant clinic for evaluation for kidney, kidney/pancreas or pancreas only transplant.  The patient attended a group education session that discussed/reviewed the following aspects of transplantation: evaluation and selection committee process, UNOS waitlist management/multiple listings, types of organs offered (KDPI < 85%, KDPI > 85%, PHS increased risk, DCD), financial aspects, surgical procedures, dietary instruction pre- and post-transplant, health maintenance pre- and post-transplant, post-transplant hospitalization and outpatient follow-up, potential to participate in a research protocol, and medication management and side effects.  A question and answer session was provided after the presentation.    The patient was seen by all members of the multi-disciplinary team to include: Nephrologist/PA, Surgeon, , Transplant Coordinator, , Pharmacist and Dietician (if applicable).    The patient reviewed and signed all consents for evaluation which were witnessed and sent to scanning into the EPIC chart.    The patient was given an education book and plan for further evaluation based on his individual assessment.      The patient was encouraged to call with any questions or concerns.

## 2018-09-18 NOTE — LETTER
September 18, 2018        Marcos Townsend  [address]  [facility] [address]  Phone: [phone]  Fax: [phone]             [facility]  [address]  [facility] [address]  Phone: [phone]   Patient: Marcos Dominguez   MR Number: [id number]   YOB: 1970   Date of Visit: 9/18/2018       Dear Dr. Marcos Townsend    Thank you for referring Mracos Dominguez to me for evaluation. Attached you will find relevant portions of my assessment and plan of care.    If you have questions, please do not hesitate to call me. I look forward to following Marcos Dominguez along with you.    Sincerely,    Kelly Aguila MD    Enclosure    If you would like to receive this communication electronically, please contact [email or web address] or [phone] to request [employer] access.    [employer] is a tool which provides read-only access to select patient information with whom you have a relationship. Its easy to use and provides real time access to review your patients record including encounter summaries, notes, results, and demographic information.    If you feel you have received this communication in error or would no longer like to receive these types of communications, please e-mail [email or web address]

## 2018-09-27 ENCOUNTER — TELEPHONE (OUTPATIENT)
Dept: TRANSPLANT | Facility: CLINIC | Age: 48
End: 2018-09-27

## 2018-09-27 NOTE — TELEPHONE ENCOUNTER
----- Message from Kelly Aguila MD sent at 9/21/2018  7:47 AM CDT -----  Please note post txp support for re-txp. He had some compliance issues that apparently resolved.      ----- Message -----  From: Heather Paz RN  Sent: 9/20/2018   8:43 PM  To: Kelly Aguila MD, #    Yes.  ----- Message -----  From: Kelly Aguila MD  Sent: 9/19/2018   6:09 PM  To: Heather Paz, RN, #    Thank you so much.  Are you supportive of him being retransplanted?    ----- Message -----  From: Heather Paz RN  Sent: 9/19/2018   9:37 AM  To: Kelly Aguila MD, #    He had a compliance issue some time ago.He needed treatment for rejection late 2016/ early 2017, and stopped answering the phone or communicating with us or Dr. Townsend office. He went through a divorce, and had problems traveling from [place] with his work schedule. He has been very willing to do whatever we ask within the last year or so.   Heather  ----- Message -----  From: Kelly Aguila MD  Sent: 9/18/2018  12:53 PM  To: [facility] Pre-Kidney Transplant Clinical, #    This gentleman resumed dialysis October 2017!  He presented today for 3rd transplant evaluation.  We need posttransplant coordinator opinion regarding compliance as part of re-evaluation..

## 2018-10-09 ENCOUNTER — SOCIAL WORK (OUTPATIENT)
Dept: TRANSPLANT | Facility: CLINIC | Age: 48
End: 2018-10-09

## 2018-10-09 NOTE — PROGRESS NOTES
[facility], [phone]. Tawny Carrerajyoti LCSW. Schedule: Hemodialysis MWF.    10-1-18 Dialysis adherence report shows patient has missed partial dialysis treatments two times due to medical issues: 8-6-18 missed 91 minutes - itching and chest was hurting, 8-15-18 missed 67 mins - back pain chronic  Pt has 0 AMA, 0 no show and 0 psychosocial issues at dialysis within last 3 months. Call placed to dialysis unit  to discuss any issues with dialysis compliance report and dialysis unit  not available; left message for return call.    Psychosocial Suitability: Patient presents as a suitable candidate for kidney transplant at this time pending conversation with dialysis unit  regarding dialysis compliance report. Based on psychosocial risk factors, patient presents as low risk, due to patient reporting having organ transplant caregiver/transportation plan, medical insurance plan and plan to afford transplant costs all in place.

## 2018-10-11 ENCOUNTER — SOCIAL WORK (OUTPATIENT)
Dept: TRANSPLANT | Facility: CLINIC | Age: 48
End: 2018-10-11

## 2018-10-17 ENCOUNTER — SOCIAL WORK (OUTPATIENT)
Dept: TRANSPLANT | Facility: CLINIC | Age: 48
End: 2018-10-17

## 2018-10-17 NOTE — PROGRESS NOTES
10-11-18 Dialysis compliance update follow up with dialysis social worker.    [facility], [phone]. Tawny Trevizo LCSW. Schedule: Hemodialysis MWF.     Dialysis social worker Tawny [phone] returned call 10-11-18 regarding more information about patient's dialysis compliance update. Dialysis social worker reports pt has only signed off early twice when he was very ill. 10-1-18 Dialysis adherence report shows patient has missed partial dialysis treatments two times due to medical issues: 8-6-18 missed 91 minutes - itching and chest was hurting, 8-15-18 missed 67 mins - back pain chronic  Pt has 0 AMA, 0 no show and 0 psychosocial issues at dialysis within last 3 months.  Dialysis social worker reports patient does not have a repetitive history of signing off early at all. Dialysis social worker reports belief patient is a compliant, motivated patient and is eager to do what it takes to be organ transplanted.    Psychosocial Suitability: Patient presents as a suitable candidate for kidney transplant at this time. Based on psychosocial risk factors, patient presents as low risk, due to patient reporting having organ transplant caregiver/transportation plan, medical insurance plan and plan to afford transplant costs all in place.      Paula De Leon MSW LCSW

## 2018-10-18 ENCOUNTER — TELEPHONE (OUTPATIENT)
Dept: TRANSPLANT | Facility: CLINIC | Age: 48
End: 2018-10-18

## 2018-10-18 ENCOUNTER — DOCUMENTATION ONLY (OUTPATIENT)
Dept: TRANSPLANT | Facility: CLINIC | Age: 48
End: 2018-10-18

## 2018-10-18 NOTE — TELEPHONE ENCOUNTER
Nurse spoke with the patient and he states he do not think he had PFT's done in the past. He was informed that we will send him a letter in the mail to bring to his PCP or nephrologist to get the testing done. Patient verbalized understanding of the above information. All questions answered.

## 2018-10-18 NOTE — NURSING
Attempt made to contact patient regarding additional testing needed to complete his kidney transplant evaluation. No answer. Message left informing pt that we will request records from [facility] for Stress test, echo, cards clearance, and colonoscopy. He will also need PFT's. We will request those records as well. Call back number provided for patient to return call.

## 2018-10-18 NOTE — TELEPHONE ENCOUNTER
----- Message from Meghann Lind sent at 10/18/2018  1:07 PM CDT -----  Contact: Patient      ----- Message -----  From: Braeden Root  Sent: 10/18/2018  12:51 PM  To: [facility] Pre-Kidney Transplant Clinical    Patient Returning Call from [facility]    Who Left Message for Patient: Leightoncorine    Communication Preference: [phone]    Additional Information:

## 2019-01-16 ENCOUNTER — TELEPHONE (OUTPATIENT)
Dept: TRANSPLANT | Facility: CLINIC | Age: 49
End: 2019-01-16

## 2019-01-17 ENCOUNTER — TELEPHONE (OUTPATIENT)
Dept: TRANSPLANT | Facility: CLINIC | Age: 49
End: 2019-01-17

## 2019-01-17 NOTE — TELEPHONE ENCOUNTER
----- Message from Vicenta Méndez RN sent at 1/16/2019  4:32 PM CST -----  Contact: patient       ----- Message -----  From: Dedra Guardado  Sent: 1/16/2019   4:03 PM  To: [facility] Pre-Kidney Transplant Clinical, #    Patient has a medical question about a test that he has to take and would like a call today if possible.           Please call [phone]      Thanks!

## 2019-07-10 ENCOUNTER — TELEPHONE (OUTPATIENT)
Dept: TRANSPLANT | Facility: CLINIC | Age: 49
End: 2019-07-10

## 2019-07-16 ENCOUNTER — TELEPHONE (OUTPATIENT)
Dept: TRANSPLANT | Facility: HOSPITAL | Age: 49
End: 2019-07-16

## 2019-07-16 NOTE — TELEPHONE ENCOUNTER
Please assess if he has potential living donor. cPRA has been 0.  We would recommend weaning off IS, but I recall this is being managed by his general nephrologist.

## 2019-07-19 ENCOUNTER — COMMITTEE REVIEW (OUTPATIENT)
Dept: TRANSPLANT | Facility: CLINIC | Age: 49
End: 2019-07-19

## 2019-07-19 NOTE — LETTER
July 19, 2019    Nguyễn Dominguez  [address] [place] [address]    Dear Nguyễn Angelica:  MRN: [id number]    It is the duty of the [facility] Selection Committee to determine which patients are candidates for a transplant. For this reason, our committee has the difficult task of evaluating patients to determine which ones have the greatest chance of having a successful transplant. We are aware of the magnitude of this responsibility, and we approach it with reverence and humility.    It is with regret I inform you that you are not approved as a transplant candidate due to multiple comorbidities including obesity, coronary calcifications, aortic root dilation, emphysema, history of smoking, and poor follow up of previous medical recommendations in past - all of which makes a 3rd kidney transplant too high risk. Based on this review, we have determined that at this time, you are not a candidate for a transplant at [facility].  We recommend that you continue follow-up Cardiology due to progressive aortic root dilation.    The selection committee carefully considers each patient's transplant candidacy and determines whether it is safe to proceed with transplantation on a case-by-case basis using established selection criteria.  At present, the risk of proceeding with an elective transplant surgery has become too high.                                                                               Although the selection committee believes you are not a suitable transplant candidate, you have the option to be evaluated at other transplant centers who may have different selection criteria.  You may request your [facility] records be sent to any center of your choice by contacting our Medical Records Department at [phone].                                                                               Attached is a letter from the [employer] Organ [employer]aring (UNOS).  It describes the services and  information offered to patients by UNOS and the Organ Procurement and Transplant Network.    The [facility] Selection Committee sincerely wishes you the best and remains available to answer any questions.  Please do not hesitate to contact our pre-transplant office if we can assist you in any other way.                                                                               Sincerely,    Kelly Dietz MD  Medical Director, Kidney & Kidney/Pancreas Transplantation  [facility]  Encl: UNOS Letter  Faxed to:  [facility]                   OPTN/UNOS: Your Resource for Organ Transplant Information        If you have a question regarding your own medical care, you always should call your transplant center first. However, for general organ transplant-related information, you can call the [employer] Organ [employer]aring (UNOS) toll-free patient services line at [phone].    Anyone, including potential transplant candidates, recipients, family members/friends, living donors, and/or donor family members can call this number to:    · talk about organ donation, living donation, how transplant and donation work, the donation process, transplant policies, and transplant/donor information;  · get a free patient information kit with helpful booklets, waiting list and transplant information, and a list of all transplant centers;  · ask questions about the Organ Procurement and Transplantation Network (OPTN) web site (www.optn.transplant.hrsa.gov); the UNOS Web site (www.unos.org); or the UNOS web site for living donors and transplant recipients (www.transplantliving.org);  · learn how UNOS and the OPTN can help you;  · talk about any concerns that you may have with a transplant center and how they perform    UNOS is a not-for-profit organization that provides all of the administrative services for the national OPTN under federal contract to the Health Resources and  Services Administration (HRSA), an agency under the U.S. Department of Health and Human Services (HHS).     UNOS and OPTN responsibilities include:    · writing educational material for patients, the public and professionals;  · helping to make people aware of the need for donated organs and tissue;  · writing organ transplant policy with help from doctors, nurses, transplant patients/candidates, donor families, living donors, and the public;  · coordinating the organ matching and placement process;  · collecting information about every organ transplant and donation that occurs in the [place].    Remember, you should contact your transplant center directly if you have questions or concerns about your own medical care including medical records, work-up progress and test reports. [facility] is not your transplant center, and staff at [facility] will not be able to transfer you to your transplant center, so keep your transplant centers phone number handy. But, while you research your transplant needs and learn as much as you can about transplantation and donation, we welcome your call to our toll-free patient services line at [phone].

## 2019-07-19 NOTE — COMMITTEE REVIEW
Native Organ Dx: Membranous Glomerulonephritis      Not approved for LRD/CAD transplant due to multiple comorbidity including   obesity, coronary calcifications, aortic root dilation, Emphysema, history of smoking, and poor follow up of previous medical recommendations in past  making 3rd re-transplantation too high risk.     Would confirm whether patient has been weaned off immunosuppression. He has been back on HD since 10/26/17. Also would advise him to have follow-up colonoscopy in 6-12 months as recommended by GI secondary to giant colon polyps on colonoscopy from Feb 2019.       Committee's decision discussed with patient. Patient seems ok with committee decision. He also stated that he's being presented at [place] transplant center today.  Patient also informed that we recommend cardiology follow-up due to aortic root dilation. All questions were answered and patient verbalized understanding.     Note written by Linda Gu RN    ===============================================      I was present at the meeting and attest to the decision of the committee. My additional comments are bolded above.    Aliyah Holt MD